# Patient Record
Sex: FEMALE | Race: WHITE | NOT HISPANIC OR LATINO | URBAN - METROPOLITAN AREA
[De-identification: names, ages, dates, MRNs, and addresses within clinical notes are randomized per-mention and may not be internally consistent; named-entity substitution may affect disease eponyms.]

---

## 2022-08-16 PROBLEM — Z00.00 ENCOUNTER FOR PREVENTIVE HEALTH EXAMINATION: Status: ACTIVE | Noted: 2022-08-16

## 2022-08-18 ENCOUNTER — OUTPATIENT (OUTPATIENT)
Dept: OUTPATIENT SERVICES | Facility: HOSPITAL | Age: 57
LOS: 1 days | End: 2022-08-18
Payer: COMMERCIAL

## 2022-08-18 ENCOUNTER — APPOINTMENT (OUTPATIENT)
Dept: CT IMAGING | Facility: CLINIC | Age: 57
End: 2022-08-18

## 2022-08-18 DIAGNOSIS — K51.811 OTHER ULCERATIVE COLITIS WITH RECTAL BLEEDING: ICD-10-CM

## 2022-08-18 PROCEDURE — 74177 CT ABD & PELVIS W/CONTRAST: CPT

## 2022-08-18 PROCEDURE — 74177 CT ABD & PELVIS W/CONTRAST: CPT | Mod: 26

## 2023-05-17 ENCOUNTER — APPOINTMENT (OUTPATIENT)
Dept: SURGERY | Facility: CLINIC | Age: 58
End: 2023-05-17
Payer: COMMERCIAL

## 2023-05-17 VITALS
RESPIRATION RATE: 17 BRPM | OXYGEN SATURATION: 95 % | HEART RATE: 99 BPM | SYSTOLIC BLOOD PRESSURE: 133 MMHG | HEIGHT: 60 IN | TEMPERATURE: 94.8 F | BODY MASS INDEX: 37.89 KG/M2 | WEIGHT: 193 LBS | DIASTOLIC BLOOD PRESSURE: 81 MMHG

## 2023-05-17 DIAGNOSIS — K50.90 CROHN'S DISEASE, UNSPECIFIED, W/OUT COMPLICATIONS: ICD-10-CM

## 2023-05-17 DIAGNOSIS — K63.1 PERFORATION OF INTESTINE (NONTRAUMATIC): ICD-10-CM

## 2023-05-17 DIAGNOSIS — N82.3 FISTULA OF VAGINA TO LARGE INTESTINE: ICD-10-CM

## 2023-05-17 DIAGNOSIS — Z78.9 OTHER SPECIFIED HEALTH STATUS: ICD-10-CM

## 2023-05-17 DIAGNOSIS — Z83.79 FAMILY HISTORY OF OTHER DISEASES OF THE DIGESTIVE SYSTEM: ICD-10-CM

## 2023-05-17 DIAGNOSIS — Z98.890 OTHER SPECIFIED POSTPROCEDURAL STATES: ICD-10-CM

## 2023-05-17 PROCEDURE — 99204 OFFICE O/P NEW MOD 45 MIN: CPT | Mod: 25

## 2023-05-17 PROCEDURE — 46600 DIAGNOSTIC ANOSCOPY SPX: CPT

## 2023-05-17 RX ORDER — METFORMIN HYDROCHLORIDE 625 MG/1
TABLET ORAL
Refills: 0 | Status: ACTIVE | COMMUNITY

## 2023-05-17 RX ORDER — FLUTICASONE PROPIONATE AND SALMETEROL 100; 50 UG/1; UG/1
100-50 POWDER RESPIRATORY (INHALATION)
Refills: 0 | Status: ACTIVE | COMMUNITY

## 2023-05-17 RX ORDER — ALBUTEROL 90 MCG
90 AEROSOL (GRAM) INHALATION
Refills: 0 | Status: ACTIVE | COMMUNITY

## 2023-05-17 RX ORDER — MULTIVIT-MIN/FOLIC/VIT K/LYCOP 400-300MCG
25 MCG TABLET ORAL
Refills: 0 | Status: ACTIVE | COMMUNITY

## 2023-05-17 RX ORDER — METOPROLOL SUCCINATE 25 MG/1
25 TABLET, EXTENDED RELEASE ORAL
Refills: 0 | Status: ACTIVE | COMMUNITY

## 2023-05-17 RX ORDER — B-COMPLEX WITH VITAMIN C
TABLET ORAL
Refills: 0 | Status: ACTIVE | COMMUNITY

## 2023-05-17 RX ORDER — MIDODRINE HYDROCHLORIDE 10 MG/1
10 TABLET ORAL
Refills: 0 | Status: ACTIVE | COMMUNITY

## 2023-05-17 RX ORDER — ASCORBIC ACID 500 MG
TABLET ORAL
Refills: 0 | Status: ACTIVE | COMMUNITY

## 2023-05-17 RX ORDER — RIVAROXABAN 20 MG/1
20 TABLET, FILM COATED ORAL
Refills: 0 | Status: ACTIVE | COMMUNITY

## 2023-05-17 RX ORDER — EMPAGLIFLOZIN 25 MG/1
25 TABLET, FILM COATED ORAL
Refills: 0 | Status: ACTIVE | COMMUNITY

## 2023-05-17 RX ORDER — MESALAMINE 1.2 G/1
1.2 TABLET, DELAYED RELEASE ORAL
Refills: 0 | Status: ACTIVE | COMMUNITY

## 2023-05-17 RX ORDER — ROSUVASTATIN CALCIUM 10 MG/1
10 TABLET, FILM COATED ORAL
Refills: 0 | Status: ACTIVE | COMMUNITY

## 2023-05-17 NOTE — HISTORY OF PRESENT ILLNESS
[FreeTextEntry1] : Breonna is a 58 y/o female here for a consultation visit, possible fissure.\par \par CT A+P on 08/18/2022 - No definable bowel wall thickening. Questionable mild mucosal prominence \par in the rectosigmoid region without focal abnormality or pericolonic \par inflammatory changes. Marked diastases rectus sheath with a more focal widemouth midline hernia\par \par Colonoscopy on 1/18/23 - Pseudopolyps in the entire examined colon. Biopsies were taken with a cold forceps for histology in the sigmoid colon, in the descending colon, in the transverse colon, in the ascending colon, and in the cecum. Pathology - 1. Cecum, biopsy: colonic mucosa with focal active colitis. Negative for granulomas or dysplasia. 2. Ascending colon, biopsy: colonic mucosa with mild crypt distortion. Negative for activity, granulomas or dysplasia. 3. Transverse colon, biopsy: colonic mucosa with mild Paneth cell metaplasia. Negative for activity, granulomas or dysplasia. 4. Descending colon, biopsy: colonic mucosa with no diagnostic histopathologic changes. Negative for activity, granulomas or dysplasia. 5. Sigmoid, biopsy:  colonic mucosa with no diagnostic histopathologic changes, Negative for activity, granulomas or dysplasia.\par \par Today pt reports no pain. Daily 2 times BMs, formed - sometimes diarrhea, no straining, denies pain, no bleeding, does have daily stool (sometimes loose sometimes mushy) coming from vagina - started since Fall of 2022, and denies feeling swollen or prolapsed tissue.  Takes Xarelto, hx of PE. Hx of colostomy - put in place in 1993 and reversed in 1994 due to colon perforation (done in [place]).  Taking mesalamine for colitis.  Patient also had a recent admission for exacerbation of her colitis requiring time in the ICU.  She has refused biologic therapy.

## 2023-05-17 NOTE — CONSULT LETTER
[Dear  ___] : Dear ~STEVEN, [Courtesy Letter:] : I had the pleasure of seeing your patient, [unfilled], in my office today. [Please see my note below.] : Please see my note below. [Consult Closing:] : Thank you very much for allowing me to participate in the care of this patient.  If you have any questions, please do not hesitate to contact me. [Sincerely,] : Sincerely, [FreeTextEntry2] : Dr. Max Arellano [FreeTextEntry3] : Shakir Jackson M.D., DEBBIE.KRYSTA., [email or web address].\[facility] Chief Colorectal Clinical Services, [facility] [DrMinh  ___] : Dr. CARCAMO

## 2023-05-17 NOTE — PHYSICAL EXAM
[Normal Breath Sounds] : Normal breath sounds [Normal Heart Sounds] : normal heart sounds [No Rash or Lesion] : No rash or lesion [Alert] : alert [Oriented to Person] : oriented to person [Oriented to Place] : oriented to place [Oriented to Time] : oriented to time [Calm] : calm [Abdomen Masses] : No abdominal masses [Abdomen Tenderness] : ~T No ~M abdominal tenderness [de-identified] : Multiple reducible nontender incisional hernias. [de-identified] : WNL [de-identified] : WNL [de-identified] : DAVIDL [de-identified] : WNL ROM [de-identified] : WNL [FreeTextEntry1] : Perianal inspection demonstrated cylindrical tags and anal ulcers.  Digital exam and anoscopy confirmed the finding.\par \par Anoscopy performed to evaluate anal canal.  No sedation required.

## 2023-05-17 NOTE — ASSESSMENT
[FreeTextEntry1] : I have seen and evaluated patient and I have corroborated all nursing input into this note.  Patient with a history of colon perforation in 1993.  My office is obtaining the operative record and pathology report.  She has a history of colitis and a recent admission for colitis exacerbation.  She has multiple anal ulcers and cylindrical skin tags on exam.  These findings are consistent with perianal Crohn's disease.  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  Unfortunately, surgical repair is not successful in this circumstance.  On the other hand, biologic therapy can result in significant healing and a reduction of symptoms.  I discussed this with the patient and with Dr. Arellano.   The patient will follow-up with Dr. Arellano and return here as needed.

## 2023-07-21 ENCOUNTER — OUTPATIENT (OUTPATIENT)
Dept: OUTPATIENT SERVICES | Facility: HOSPITAL | Age: 58
LOS: 1 days | End: 2023-07-21
Payer: COMMERCIAL

## 2023-07-21 ENCOUNTER — APPOINTMENT (OUTPATIENT)
Dept: CT IMAGING | Facility: CLINIC | Age: 58
End: 2023-07-21
Payer: COMMERCIAL

## 2023-07-21 DIAGNOSIS — Z00.8 ENCOUNTER FOR OTHER GENERAL EXAMINATION: ICD-10-CM

## 2023-07-21 PROCEDURE — 74177 CT ABD & PELVIS W/CONTRAST: CPT

## 2023-07-21 PROCEDURE — 74177 CT ABD & PELVIS W/CONTRAST: CPT | Mod: 26
